# Patient Record
Sex: MALE | Race: WHITE | NOT HISPANIC OR LATINO | Employment: FULL TIME | URBAN - METROPOLITAN AREA
[De-identification: names, ages, dates, MRNs, and addresses within clinical notes are randomized per-mention and may not be internally consistent; named-entity substitution may affect disease eponyms.]

---

## 2023-06-21 ENCOUNTER — HOSPITAL ENCOUNTER (OUTPATIENT)
Dept: ULTRASOUND IMAGING | Facility: HOSPITAL | Age: 28
Discharge: HOME | End: 2023-06-21
Attending: UROLOGY
Payer: COMMERCIAL

## 2023-06-21 DIAGNOSIS — N45.1 EPIDIDYMITIS: ICD-10-CM

## 2023-06-21 PROCEDURE — 76870 US EXAM SCROTUM: CPT

## 2025-04-24 ENCOUNTER — OFFICE VISIT (OUTPATIENT)
Dept: FAMILY MEDICINE CLINIC | Facility: CLINIC | Age: 30
End: 2025-04-24
Payer: COMMERCIAL

## 2025-04-24 VITALS
HEART RATE: 62 BPM | HEIGHT: 75 IN | DIASTOLIC BLOOD PRESSURE: 60 MMHG | OXYGEN SATURATION: 98 % | BODY MASS INDEX: 25.12 KG/M2 | WEIGHT: 202 LBS | SYSTOLIC BLOOD PRESSURE: 122 MMHG

## 2025-04-24 DIAGNOSIS — Z13.1 SCREENING FOR DIABETES MELLITUS: ICD-10-CM

## 2025-04-24 DIAGNOSIS — R53.83 OTHER FATIGUE: Primary | ICD-10-CM

## 2025-04-24 DIAGNOSIS — Z13.220 SCREENING FOR LIPID DISORDERS: ICD-10-CM

## 2025-04-24 PROCEDURE — 99204 OFFICE O/P NEW MOD 45 MIN: CPT | Performed by: NURSE PRACTITIONER

## 2025-04-24 NOTE — PROGRESS NOTES
Name: Darío Dean      : 1995      MRN: [id number]  Encounter Provider: MARGARITA Ferrer  Encounter Date: 2025   Encounter department: [facility] PRIMARY CARE  :  Assessment & Plan  Other fatigue  Multiple labs were ordered to assess for causes of the patient's fatigue.  If all of the patient's lab work is normal he will be referred to a sleep medicine specialist to have testing completed to assess for LAKESHA or limb movement disorders.  Orders:  •  CBC and differential; Future  •  TSH, 3rd generation; Future  •  Iron Panel (Includes Ferritin, Iron Sat%, Iron, and TIBC); Future  •  Lyme Total AB W Reflex to IGM/IGG; Future  •  Vitamin D 25 hydroxy; Future    Screening for diabetes mellitus    Orders:  •  Comprehensive metabolic panel; Future  •  UA w Reflex to Microscopic w Reflex to Culture; Future  •  Hemoglobin A1C; Future    Screening for lipid disorders    Orders:  •  Lipid panel; Future          Depression Screening and Follow-up Plan: Patient was screened for depression during today's encounter. They screened negative with a PHQ-2 score of 0.        History of Present Illness   Fatigue: Patient reports over the past 4 months he has been experiencing increased fatigue.  He reports that he does typically get 6 to 8 hours of sleep per night however, his wife does report that he does seem to move a lot in his sleep.  He denies being told by his wife that he snores or has periods of apnea.      Review of Systems   Constitutional:  Positive for fatigue. Negative for chills and fever.   HENT:  Negative for ear pain and sore throat.    Eyes:  Negative for pain and visual disturbance.   Respiratory:  Negative for cough, chest tightness, shortness of breath and wheezing.    Cardiovascular:  Negative for chest pain, palpitations and leg swelling.   Gastrointestinal:  Negative for abdominal pain, constipation, diarrhea, nausea and vomiting.   Endocrine: Negative for cold intolerance and heat  "intolerance.   Genitourinary:  Negative for decreased urine volume, dysuria and hematuria.   Musculoskeletal:  Negative for arthralgias, back pain and myalgias.   Skin:  Negative for color change and rash.   Allergic/Immunologic: Negative for environmental allergies.   Neurological:  Negative for dizziness, seizures, syncope, weakness, light-headedness, numbness and headaches.   Hematological:  Negative for adenopathy.   Psychiatric/Behavioral:  Negative for confusion. The patient is not nervous/anxious.    All other systems reviewed and are negative.      Objective   /60 (BP Location: Right arm, Patient Position: Sitting, Cuff Size: Standard)   Pulse 62   Ht 6' 3\" (1.905 m)   Wt 91.6 kg (202 lb)   SpO2 98%   BMI 25.25 kg/m²      Physical Exam  Vitals and nursing note reviewed.   Constitutional:       General: He is not in acute distress.     Appearance: Normal appearance. He is well-developed. He is not ill-appearing.   HENT:      Head: Normocephalic.   Eyes:      Conjunctiva/sclera: Conjunctivae normal.   Cardiovascular:      Rate and Rhythm: Normal rate and regular rhythm.      Pulses: Normal pulses.           Carotid pulses are 2+ on the right side and 2+ on the left side.       Radial pulses are 2+ on the right side and 2+ on the left side.        Posterior tibial pulses are 2+ on the right side and 2+ on the left side.      Heart sounds: Normal heart sounds. No murmur heard.  Pulmonary:      Effort: Pulmonary effort is normal. No respiratory distress.      Breath sounds: Normal breath sounds. No decreased breath sounds, wheezing, rhonchi or rales.   Abdominal:      General: Abdomen is flat. Bowel sounds are normal. There is no distension.      Palpations: Abdomen is soft.      Tenderness: There is no abdominal tenderness. There is no guarding.   Musculoskeletal:         General: No swelling. Normal range of motion.      Cervical back: Normal range of motion and neck supple.      Right lower leg: No " edema.      Left lower leg: No edema.   Skin:     General: Skin is warm and dry.      Capillary Refill: Capillary refill takes less than 2 seconds.   Neurological:      General: No focal deficit present.      Mental Status: He is alert and oriented to person, place, and time.   Psychiatric:         Mood and Affect: Mood normal.         Behavior: Behavior normal.         Thought Content: Thought content normal.         Judgment: Judgment normal.

## 2025-04-24 NOTE — ASSESSMENT & PLAN NOTE
Multiple labs were ordered to assess for causes of the patient's fatigue.  If all of the patient's lab work is normal he will be referred to a sleep medicine specialist to have testing completed to assess for LAKESHA or limb movement disorders.  Orders:  •  CBC and differential; Future  •  TSH, 3rd generation; Future  •  Iron Panel (Includes Ferritin, Iron Sat%, Iron, and TIBC); Future  •  Lyme Total AB W Reflex to IGM/IGG; Future  •  Vitamin D 25 hydroxy; Future

## 2025-04-29 ENCOUNTER — RESULTS FOLLOW-UP (OUTPATIENT)
Dept: FAMILY MEDICINE CLINIC | Facility: CLINIC | Age: 30
End: 2025-04-29

## 2025-04-29 ENCOUNTER — APPOINTMENT (OUTPATIENT)
Dept: LAB | Facility: CLINIC | Age: 30
End: 2025-04-29
Payer: COMMERCIAL

## 2025-04-29 DIAGNOSIS — R53.83 OTHER FATIGUE: ICD-10-CM

## 2025-04-29 DIAGNOSIS — Z13.1 SCREENING FOR DIABETES MELLITUS: ICD-10-CM

## 2025-04-29 DIAGNOSIS — Z13.220 SCREENING FOR LIPID DISORDERS: ICD-10-CM

## 2025-04-29 LAB
25(OH)D3 SERPL-MCNC: 28.9 NG/ML (ref 30–100)
ALBUMIN SERPL BCG-MCNC: 4.3 G/DL (ref 3.5–5)
ALP SERPL-CCNC: 50 U/L (ref 34–104)
ALT SERPL W P-5'-P-CCNC: 40 U/L (ref 7–52)
ANION GAP SERPL CALCULATED.3IONS-SCNC: 10 MMOL/L (ref 4–13)
AST SERPL W P-5'-P-CCNC: 55 U/L (ref 13–39)
B BURGDOR IGG+IGM SER QL IA: NEGATIVE
BASOPHILS # BLD AUTO: 0.05 THOUSANDS/ÂΜL (ref 0–0.1)
BASOPHILS NFR BLD AUTO: 1 % (ref 0–1)
BILIRUB SERPL-MCNC: 1.05 MG/DL (ref 0.2–1)
BILIRUB UR QL STRIP: NEGATIVE
BUN SERPL-MCNC: 12 MG/DL (ref 5–25)
CALCIUM SERPL-MCNC: 9 MG/DL (ref 8.4–10.2)
CHLORIDE SERPL-SCNC: 104 MMOL/L (ref 96–108)
CHOLEST SERPL-MCNC: 147 MG/DL (ref ?–200)
CLARITY UR: CLEAR
CO2 SERPL-SCNC: 27 MMOL/L (ref 21–32)
COLOR UR: COLORLESS
CREAT SERPL-MCNC: 0.91 MG/DL (ref 0.6–1.3)
EOSINOPHIL # BLD AUTO: 0.17 THOUSAND/ÂΜL (ref 0–0.61)
EOSINOPHIL NFR BLD AUTO: 4 % (ref 0–6)
ERYTHROCYTE [DISTWIDTH] IN BLOOD BY AUTOMATED COUNT: 13 % (ref 11.6–15.1)
EST. AVERAGE GLUCOSE BLD GHB EST-MCNC: 94 MG/DL
FERRITIN SERPL-MCNC: 37 NG/ML (ref 30–336)
GFR SERPL CREATININE-BSD FRML MDRD: 112 ML/MIN/1.73SQ M
GLUCOSE P FAST SERPL-MCNC: 90 MG/DL (ref 65–99)
GLUCOSE UR STRIP-MCNC: NEGATIVE MG/DL
HBA1C MFR BLD: 4.9 %
HCT VFR BLD AUTO: 44.6 % (ref 36.5–49.3)
HDLC SERPL-MCNC: 75 MG/DL
HGB BLD-MCNC: 15.5 G/DL (ref 12–17)
HGB UR QL STRIP.AUTO: NEGATIVE
IMM GRANULOCYTES # BLD AUTO: 0.01 THOUSAND/UL (ref 0–0.2)
IMM GRANULOCYTES NFR BLD AUTO: 0 % (ref 0–2)
IRON SATN MFR SERPL: 57 % (ref 15–50)
IRON SERPL-MCNC: 148 UG/DL (ref 50–212)
KETONES UR STRIP-MCNC: NEGATIVE MG/DL
LDLC SERPL CALC-MCNC: 64 MG/DL (ref 0–100)
LEUKOCYTE ESTERASE UR QL STRIP: NEGATIVE
LYMPHOCYTES # BLD AUTO: 1.65 THOUSANDS/ÂΜL (ref 0.6–4.47)
LYMPHOCYTES NFR BLD AUTO: 40 % (ref 14–44)
MCH RBC QN AUTO: 32 PG (ref 26.8–34.3)
MCHC RBC AUTO-ENTMCNC: 34.8 G/DL (ref 31.4–37.4)
MCV RBC AUTO: 92 FL (ref 82–98)
MONOCYTES # BLD AUTO: 0.47 THOUSAND/ÂΜL (ref 0.17–1.22)
MONOCYTES NFR BLD AUTO: 12 % (ref 4–12)
NEUTROPHILS # BLD AUTO: 1.74 THOUSANDS/ÂΜL (ref 1.85–7.62)
NEUTS SEG NFR BLD AUTO: 43 % (ref 43–75)
NITRITE UR QL STRIP: NEGATIVE
NONHDLC SERPL-MCNC: 72 MG/DL
NRBC BLD AUTO-RTO: 0 /100 WBCS
PH UR STRIP.AUTO: 7 [PH]
PLATELET # BLD AUTO: 229 THOUSANDS/UL (ref 149–390)
PMV BLD AUTO: 11 FL (ref 8.9–12.7)
POTASSIUM SERPL-SCNC: 4 MMOL/L (ref 3.5–5.3)
PROT SERPL-MCNC: 6.7 G/DL (ref 6.4–8.4)
PROT UR STRIP-MCNC: NEGATIVE MG/DL
RBC # BLD AUTO: 4.85 MILLION/UL (ref 3.88–5.62)
SODIUM SERPL-SCNC: 141 MMOL/L (ref 135–147)
SP GR UR STRIP.AUTO: 1 (ref 1–1.03)
TIBC SERPL-MCNC: 259 UG/DL (ref 250–450)
TRANSFERRIN SERPL-MCNC: 185 MG/DL (ref 203–362)
TRIGL SERPL-MCNC: 41 MG/DL (ref ?–150)
TSH SERPL DL<=0.05 MIU/L-ACNC: 1.67 UIU/ML (ref 0.45–4.5)
UIBC SERPL-MCNC: 111 UG/DL (ref 155–355)
UROBILINOGEN UR STRIP-ACNC: <2 MG/DL
WBC # BLD AUTO: 4.09 THOUSAND/UL (ref 4.31–10.16)

## 2025-04-29 PROCEDURE — 83550 IRON BINDING TEST: CPT

## 2025-04-29 PROCEDURE — 80053 COMPREHEN METABOLIC PANEL: CPT

## 2025-04-29 PROCEDURE — 80061 LIPID PANEL: CPT

## 2025-04-29 PROCEDURE — 83540 ASSAY OF IRON: CPT

## 2025-04-29 PROCEDURE — 83036 HEMOGLOBIN GLYCOSYLATED A1C: CPT

## 2025-04-29 PROCEDURE — 81003 URINALYSIS AUTO W/O SCOPE: CPT

## 2025-04-29 PROCEDURE — 36415 COLL VENOUS BLD VENIPUNCTURE: CPT

## 2025-04-29 PROCEDURE — 82728 ASSAY OF FERRITIN: CPT

## 2025-04-29 PROCEDURE — 84443 ASSAY THYROID STIM HORMONE: CPT

## 2025-04-29 PROCEDURE — 82306 VITAMIN D 25 HYDROXY: CPT

## 2025-04-29 PROCEDURE — 86618 LYME DISEASE ANTIBODY: CPT

## 2025-04-29 PROCEDURE — 85025 COMPLETE CBC W/AUTO DIFF WBC: CPT

## 2025-04-30 DIAGNOSIS — R53.83 OTHER FATIGUE: ICD-10-CM

## 2025-04-30 DIAGNOSIS — R29.818 SUSPECTED SLEEP APNEA: Primary | ICD-10-CM

## 2025-05-08 ENCOUNTER — OFFICE VISIT (OUTPATIENT)
Dept: SLEEP CENTER | Facility: CLINIC | Age: 30
End: 2025-05-08
Attending: NURSE PRACTITIONER
Payer: COMMERCIAL

## 2025-05-08 VITALS
SYSTOLIC BLOOD PRESSURE: 122 MMHG | HEART RATE: 53 BPM | WEIGHT: 202 LBS | DIASTOLIC BLOOD PRESSURE: 62 MMHG | HEIGHT: 75 IN | BODY MASS INDEX: 25.12 KG/M2 | OXYGEN SATURATION: 99 %

## 2025-05-08 DIAGNOSIS — R53.83 OTHER FATIGUE: Primary | ICD-10-CM

## 2025-05-08 DIAGNOSIS — R29.818 SUSPECTED SLEEP APNEA: Primary | ICD-10-CM

## 2025-05-08 DIAGNOSIS — R53.83 OTHER FATIGUE: ICD-10-CM

## 2025-05-08 DIAGNOSIS — G47.8 NON-RESTORATIVE SLEEP: ICD-10-CM

## 2025-05-08 DIAGNOSIS — G47.19 EXCESSIVE DAYTIME SLEEPINESS: ICD-10-CM

## 2025-05-08 DIAGNOSIS — R79.0 LOW FERRITIN LEVEL: ICD-10-CM

## 2025-05-08 DIAGNOSIS — G25.81 RLS (RESTLESS LEGS SYNDROME): ICD-10-CM

## 2025-05-08 PROCEDURE — 99204 OFFICE O/P NEW MOD 45 MIN: CPT | Performed by: INTERNAL MEDICINE

## 2025-05-08 NOTE — PATIENT INSTRUCTIONS
Iron supplement: ferrous sulfate 325 mg together with vitamin C 100 mg to be taken on an empty stomach     Nursing Support:  When: Monday through Friday 7:30A-4:30PM except holidays  Where: Our direct line is [phone]  *3  *1.      If you are having a true emergency please call 911.  In the event that the line is busy or it is after hours please leave a voice message and we will return your call.  Please speak clearly, leaving your full name, birth date, best number to reach you and the reason for your call.   Medication refills: We will need the name of the medication, the dosage, the ordering provider, whether you get a 30 or 90 day refill, and the pharmacy name and address.  Medications will be ordered by the provider only.  Nurses cannot call in prescriptions.  Please allow 7 days for medication refills.  Physician requested updates: If your provider requested that you call with an update after starting medication, please be ready to provide us the medication and dosage, what time you take your medication, the time you attempt to fall asleep, time you fall asleep, when you wake up, and what time you get out of bed.  Sleep Study Results: We will contact you with sleep study results and/or next steps after the physician has reviewed your testing.

## 2025-05-08 NOTE — PROGRESS NOTES
Name: Darío Dean      : 1995      MRN: [id number]  Encounter Provider: River Murcia MD  Encounter Date: 2025   Encounter department: [facility] SLEEP MEDICINE [place]  :  Assessment & Plan  Suspected sleep apnea    Orders:    Ambulatory Referral to Sleep Medicine    Home Sleep Study; Future    Non-restorative sleep    Orders:    Home Sleep Study; Future    RLS (restless legs syndrome)         Excessive daytime sleepiness    Orders:    Home Sleep Study; Future    Other fatigue    Orders:    Ambulatory Referral to Sleep Medicine    Low ferritin level              PLAN:   Problems & Comorbidities Addressed this Visit as listed.  Stable/controlled conditions reviewed in notes.  With respect to above conditions, comprehensive counseling provided on pathophysiology; effects on symptoms and comorbidities, diagnostic strategies & limitations; treatment options; risks or no treament; risks & benefits of the various therapeutic options; costs and insurance aspects.     I advised weight reduction, avoiding sleeping supine, using alcohol or sedating medications close to bed time and on safe driving practices.    Further evaluation is indicated and a sleep study will be scheduled.  Patient understands limitations of home sleep testing and in lab study may be necessary.  Patient is willing to try Positive airway pressure therapy and will be scheduled for a titration study if indicated.  I advised taking iron supplement: ferrous sulfate 325 mg together with vitamin C 100 mg to be taken on an empty stomach   Follow-up to be scheduled after testing/initiation of therapy to review results, further details of treatment options and to adjust therapy.    History of Present Illness   HPI           Consultation - Sleep Center   Darío Dean  29 y.o. male  :1995  MRN:[id number]  DOS:2025    Physician Requesting Consult: Tacho Cook CR*             Reason for Consult : At your kind request I saw Darío  Dianne for initial sleep evaluation today. The patient is here to evaluate for suspected Obstructive Sleep Apnea.      PFSH, Problem List, Medications & Allergies were reviewed in EMR.    Darío  has a past medical history of Lyme disease (2008).      He currently has no medications in their medication list.      HPI: Patient's presents with: Constant fatigue irrespective of sleep of several years duration.  A medical workup as to cause has been unrevealing.  Darío is un aware of snoring or breathing difficulties during sleep but moves excessively during sleep.  Other complaints: None. Restless Leg Syndrome: Has typical symptoms involving left lower extremity that he is worse when trying to sleep.  He has;.  Parasomnia: Dentist reports teeth grinding during sleep for which he uses a dental guard;    Sleep Routine (averaged): Typical Bedtime: 11 PM.  Gets OOB: 7 AM. TIB:8 hrs.   Sleep latency:< 15 minutes; Sleep Interruptions: Infrequent,. Awakens: Needing alarm around 50%  Upon awakening: never feels rested.  He estimates getting  hrs sleep.  Daytime Function:Darío reports excessive daytime sleepiness feels like napping & does when has the opportunity 3-4 times a week for around 20 minutes or uses caffeine to assist staying alert. He rated himself at Total score: 10 /24 on the [place] Sleepiness Scale.     Habits:   reports that he has never smoked. He has never used smokeless tobacco.;  reports no history of alcohol use.; Reports no history of drug use.;  E-Cigarette/Vaping  Never User; Caffeine use:limited until  3 PM; Exercise routine: regular.    Occupation:   [employer] License:    Family History: Negative for sleep disturbance.  ROS: Significant for weight has been stable.  He has some nasal symptoms due to seasonal allergies.  He reported no other respiratory or cardiac symptoms.  There is no report of radicular symptoms or abnormal blood loss..     EXAM:  /62 (BP Location: Left arm, Cuff Size: Adult)   Pulse  "(!) 53   Ht 6' 3\" (1.905 m)   Wt 91.6 kg (202 lb)   SpO2 99%   BMI 25.25 kg/m²    General: Well groomed male, well appearing, in no apparent distress.   Neurological: Alert and orientated; cooperative; Cranial nerves intact;    Psychiatric: Speech: Clear and coherent; normal mood, affect & thought   Skin: Warm and dry; Color& Hydration good; no facial rashes or lesions   HEENT:  Craniofacial anatomy: normal Sinuses: Non-tender. TMJ: Normal    Eyes: EOM's intact; conjunctiva/corneas clear   Ears: Appear normal     Nasal Airway: is patent Septum: Intact; Turbinates: Normal; Rhinorrhea: None  Mouth: Lips: Normal posture; Dentition: normal . Mucosa: Moist; Hard Palate:normal    Oropharryx: crowdedTongue: Mallampati:Class IV and MobileSoft Palate:  redundant  Tonsils: absent  Neck:;\"; [no abnormal masses; Supple; no abnormal masses; Thyroid: Normal. Trachea: Central.    Heart: S1,S2 normal; RRR; no gallop; no murmur   Lungs: Respiratory Effort: Normal. Air entry good bilaterally.  No wheezes.  No rales  Abdomen:  Soft.   Extremities: No pedal edema.  No clubbing or cyanosis.    Musculoskeletal:  Motor normal; Gait: Normal.       CBC and CMP were normal.  Ferritin level is low at 37 ng/mL    Sincerely,      Authenticated electronically on 05/08/25   Board Certified Specialist     Portions of the record may have been created with voice recognition software. Occasional wrong word or \"sound a like\" substitutions may have occurred due to the inherent limitations of voice recognition software. There may also be notations and random deletions of words or characters from malfunctioning software. Read the chart carefully and recognize, using context, where substitutions/deletions have occurred.          Review of Systems           "

## 2025-05-22 ENCOUNTER — HOSPITAL ENCOUNTER (OUTPATIENT)
Facility: HOSPITAL | Age: 30
Discharge: HOME | End: 2025-05-22
Attending: INTERNAL MEDICINE

## 2025-05-22 DIAGNOSIS — G47.19 EXCESSIVE DAYTIME SLEEPINESS: ICD-10-CM

## 2025-05-22 DIAGNOSIS — G47.8 NON-RESTORATIVE SLEEP: ICD-10-CM

## 2025-05-22 DIAGNOSIS — R29.818 SUSPECTED SLEEP APNEA: ICD-10-CM

## 2025-05-22 NOTE — PROGRESS NOTES
Home Sleep Study Documentation    HOME STUDY DEVICE: Noxturnal no                                           Alejandra G3 yes device # 18      Pre-Sleep Home Study:    Set-up and instructions performed by: Zaria    Technician performed demonstration for Patient: yes    Return demonstration performed by Patient: yes    Written instructions provided to Patient: yes    Patient signed consent form: yes        Post-Sleep Home Study:    Additional comments by Patient: pending    Home Sleep Study Failed:pending    Failure reason: pending    Reported or Detected: pending    Scored by: pending

## 2025-05-27 PROBLEM — G47.33 OSA (OBSTRUCTIVE SLEEP APNEA): Status: ACTIVE | Noted: 2025-05-27

## 2025-06-06 ENCOUNTER — TELEPHONE (OUTPATIENT)
Dept: SLEEP CENTER | Facility: CLINIC | Age: 30
End: 2025-06-06

## 2025-06-06 NOTE — TELEPHONE ENCOUNTER
Home sleep study resulted and shows mild sleep apnea. GIANLUCA 5.3. Follow up with Dr. Murcia recommended.     Incoming call from patient who states he can't get into [facility] to read results.     Results and recommendation reviewed with patient.     Follow up scheduled with Dr. Murcia for 6/11/2025 in the [place] office.  Per patient request to get in ASAP as he is leaving the country for quite some time at the end of the month.     Patient given number to get assistance with [employer].     Email to Kecia Sanches and Jerica Laura to inform of add on patient.

## 2025-06-09 ENCOUNTER — TELEPHONE (OUTPATIENT)
Age: 30
End: 2025-06-09

## 2025-06-09 NOTE — TELEPHONE ENCOUNTER
Patient called in had a home sleep study on 5/22/25 and was seen for a consult in [place] on 5/08/25  Patient received a bill of $1,000 for sleep study  Patient spoke with Deborah in billing who transferred him to sleep center and advised patient to speak with    Patient requesting call back to discuss  # [phone]

## 2025-06-09 NOTE — TELEPHONE ENCOUNTER
Patient called, he received a message a few weeks ago regarding a test that was ordered to check testosterone levels.  Pt is not sure how he moves forward with this testing.    Confirmed there is an active lab order in my chart. Testosterone, free, total   No paperwork is needed if patient goes to [facility]'s lab.  This is a patient instruction: Fasting preferred. Collections for men not undergoing treatment must be completed between 7am-9am ONLY. Collection time restrictions are not applicable to women or men already undergoing treatment.      Patient asked if this lab test is covered by his insurance.  Advised patient needs to call his insurance for this information. Provided billing and diagnosis codes per order, pt will provide to insurance for estimated cost.   CPT codes, 57004 and 55838  Diagnosis:  Other fatigue (R53.83 [ICD-10-CM])    No call back needed.

## 2025-06-10 NOTE — TELEPHONE ENCOUNTER
Patient called the billing department and they sent him back to our office. Patient is stating that billing said that we our office has to fix the billing problem. I let patient know that I am just the  and I do not do anything with billing. Patient is very upset and does not understand why he got this bill. Patient has stated that he might not want to come back to the office anymore. Patient would like for a manger to call him.

## 2025-06-11 ENCOUNTER — OFFICE VISIT (OUTPATIENT)
Dept: SLEEP CENTER | Facility: CLINIC | Age: 30
End: 2025-06-11
Payer: COMMERCIAL

## 2025-06-11 VITALS
HEIGHT: 75 IN | WEIGHT: 199.6 LBS | BODY MASS INDEX: 24.82 KG/M2 | SYSTOLIC BLOOD PRESSURE: 124 MMHG | DIASTOLIC BLOOD PRESSURE: 74 MMHG

## 2025-06-11 DIAGNOSIS — G47.33 MILD OBSTRUCTIVE SLEEP APNEA: Primary | ICD-10-CM

## 2025-06-11 DIAGNOSIS — R53.83 OTHER FATIGUE: ICD-10-CM

## 2025-06-11 DIAGNOSIS — G47.8 NON-RESTORATIVE SLEEP: ICD-10-CM

## 2025-06-11 DIAGNOSIS — G25.81 RLS (RESTLESS LEGS SYNDROME): ICD-10-CM

## 2025-06-11 DIAGNOSIS — G47.19 EXCESSIVE DAYTIME SLEEPINESS: ICD-10-CM

## 2025-06-11 DIAGNOSIS — R79.0 LOW FERRITIN LEVEL: ICD-10-CM

## 2025-06-11 PROCEDURE — 99214 OFFICE O/P EST MOD 30 MIN: CPT | Performed by: INTERNAL MEDICINE

## 2025-06-11 NOTE — PROGRESS NOTES
Name: Darío Dean      : 1995      MRN: [id number]  Encounter Provider: River Murcia MD  Encounter Date: 2025   Encounter department: [facility] SLEEP MEDICINE [place]  :  Assessment & Plan  Mild obstructive sleep apnea         Non-restorative sleep         RLS (restless legs syndrome)         Excessive daytime sleepiness         Other fatigue         Low ferritin level         PLAN  -    Above listed Problems & Comorbidities were Addressed this Visit.  Conditions are improved/stable/controlled/resolved as reviewed in notes and additional Plans below.   I reviewed results of his study with him, discussed treatment options with risks and benefits.  He is due to relocate to [place] in a few weeks that limits his treatment options.  I advised continuing iron supplement targeting ferritin level of close to 100 ng/mL  He should take safety precaution with respect to possible parasomnia activity.  If RLS symptoms and excessive movement during sleep persists a therapeutic trial of gabapentin can be considered.  He may consider positive airway pressure therapy or a mandibular advancement device for his mild sleep disordered breathing.   Since finding of home sleep testing is equivocal and the relatively mild sleep disordered breathing does not adequately explain his symptoms, an in-lab  diagnostic study can be considered to confirm obstructive sleep apnea, possible upper airway resistance, whether they are also periodic limb movements of sleep or parasomnia activity.  He may also warrant an MSLT to objectively quantify presence and severity of excessive daytime drowsiness.  This will need to be undertaken when he is back in [place].  I have not scheduled any follow-up in sleep clinic at this time.    Thank you for allowing me to participate in the care of this patient.    History of Present Illness   HPI              Follow-Up Note - Sleep Center   Darío Jadenicole  29 y.o. male  :1995   "MRN:[id number]  DOS:6/11/2025    CC: I saw this patient for follow-up in clinic today for mild obstructive sleep apnea, Coexisting Sleep and Medical Problems. Interval changes: Home sleep testing was undertaken to evaluate for sleep disordered breathing and patient is here to review results and further options.     The study demonstrated a respiratory event index (GIANLUCA) of 5.3.  The lowest SpO2 recorded is 90%.  The snore index was 0.1%.     Additional comments by patient: \"Played soccer the evening before starting the study\" and as a result, had difficulty falling asleep.    PFSH, Problem List, Medications & Allergies were reviewed in EMR.   He  has a past medical history of Lyme disease (2008).    He currently has no medications in their medication list.    HPI: He is not aware of snoring.  Continues to report dysesthesias involving left thigh that tends to get worse at bedtime.  He has urges to move and can delay sleep.  He feels his symptoms have improved a little since he started taking iron supplement.  He moves excessively during sleep and wife reports flailing of his arms and on several occasions has lashed out at her heart fortunately has not injured his wife.  He has no recall of dreams associated with his acting out.  There is no report of sleepwalking.  Sleep Routine: Darío reports getting 8.5 hrs sleep; he has no difficulty initiating or maintaining sleep . He arises needing alarm around 50% and never feels rested.Darío reports excessive daytime sleepiness, feels like napping & does when has the opportunity.  He rated himself at Total score: 7 /24 on the [place] Sleepiness Scale.   Habits: Reports that he has never smoked. He has never used smokeless tobacco.,  Reports no history of alcohol use.,  Reports no history of drug use., Caffeine use: moderate amount to assist staying alert, Exercise routine: regular.     ROS: reviewed significant for weight has been stable.  He has seasonal allergies but " "presently reporting no nasal symptoms..        EXAM: /74   Ht 6' 3\" (1.905 m)   Wt 90.5 kg (199 lb 9.6 oz)   BMI 24.95 kg/m²     Wt Readings from Last 3 Encounters:   06/11/25 90.5 kg (199 lb 9.6 oz)   05/08/25 91.6 kg (202 lb)   04/24/25 91.6 kg (202 lb)     Patient is well groomed; well appearing.  CNS: Alert, orientated, clear & coherent speech.  Psych: cooperative and in no distress. Mental state: Appears normal.  H&N: EOMI; NC/AT: No facial pressure marks, no rashes.    Skin/Extrem: col & hydration normal; no edema  Resp: Respiratory effort is normal  Physical findings otherwise essentially unchanged from previous.      Sincerely,     Authenticated electronically on 06/11/25   Board Certified Specialist     Portions of the record may have been created with voice recognition software. Occasional wrong word or \"sound a like\" substitutions may have occurred due to the inherent limitations of voice recognition software. There may also be notations and random deletions of words or characters from malfunctioning software. Read the chart carefully and recognize, using context, where substitutions/deletions have occurred.    Sitting and reading: Slight chance of dozing  Watching TV: Moderate chance of dozing  Sitting, inactive in a public place (e.g. a theatre or a meeting): Would never doze  As a passenger in a car for an hour without a break: Slight chance of dozing  Lying down to rest in the afternoon when circumstances permit: High chance of dozing  Sitting and talking to someone: Would never doze  Sitting quietly after a lunch without alcohol: Would never doze  In a car, while stopped for a few minutes in traffic: Would never doze  Total score: 7     Review of Systems  Pertinent positives/negatives included in HPI and also as noted:       Objective   /74   Ht 6' 3\" (1.905 m)   Wt 90.5 kg (199 lb 9.6 oz)   BMI 24.95 kg/m²        Physical Exam  Visit Vitals  /74   Ht 6' 3\" (1.905 m)   Wt 90.5 " kg (199 lb 9.6 oz)   BMI 24.95 kg/m²   Smoking Status Never   BSA 2.19 m²                                                 Data  Lab Results   Component Value Date    HGB 15.5 04/29/2025    HCT 44.6 04/29/2025    MCV 92 04/29/2025      Lab Results   Component Value Date    CALCIUM 9.0 04/29/2025    K 4.0 04/29/2025    CO2 27 04/29/2025     04/29/2025    BUN 12 04/29/2025    CREATININE 0.91 04/29/2025     Lab Results   Component Value Date    IRON 148 04/29/2025    TIBC 259 04/29/2025    FERRITIN 37 04/29/2025     Lab Results   Component Value Date    AST 55 (H) 04/29/2025    ALT 40 04/29/2025

## 2025-06-11 NOTE — TELEPHONE ENCOUNTER
Asked for Billing Dept manager to contact me.  I do not understand why the patient keeps getting sent to the office regarding his $1000 bill.  I assume it is his deductible.

## 2025-06-13 ENCOUNTER — RESULTS FOLLOW-UP (OUTPATIENT)
Dept: FAMILY MEDICINE CLINIC | Facility: CLINIC | Age: 30
End: 2025-06-13

## 2025-06-13 LAB
TESTOST FREE SERPL-MCNC: 83.5 PG/ML (ref 35–155)
TESTOST SERPL-MCNC: 843 NG/DL (ref 250–1100)

## 2025-06-18 ENCOUNTER — OFFICE VISIT (OUTPATIENT)
Dept: FAMILY MEDICINE CLINIC | Facility: CLINIC | Age: 30
End: 2025-06-18
Payer: COMMERCIAL

## 2025-06-18 VITALS
DIASTOLIC BLOOD PRESSURE: 66 MMHG | HEIGHT: 75 IN | TEMPERATURE: 96.9 F | HEART RATE: 58 BPM | OXYGEN SATURATION: 99 % | BODY MASS INDEX: 24.99 KG/M2 | SYSTOLIC BLOOD PRESSURE: 110 MMHG | WEIGHT: 201 LBS | RESPIRATION RATE: 18 BRPM

## 2025-06-18 DIAGNOSIS — R79.0 LOW FERRITIN: ICD-10-CM

## 2025-06-18 DIAGNOSIS — R53.83 OTHER FATIGUE: Primary | ICD-10-CM

## 2025-06-18 DIAGNOSIS — E55.9 VITAMIN D DEFICIENCY: ICD-10-CM

## 2025-06-18 PROCEDURE — 99214 OFFICE O/P EST MOD 30 MIN: CPT | Performed by: NURSE PRACTITIONER

## 2025-06-18 NOTE — ASSESSMENT & PLAN NOTE
Repeat vitamin D level was ordered to be completed prior to the patient's next office visit.  Orders:  •  Vitamin D 25 hydroxy; Future

## 2025-06-18 NOTE — ASSESSMENT & PLAN NOTE
I did advise the patient that I feel that his ongoing fatigue is likely related to his vitamin D and iron deficiencies.  He was advised to begin taking daily over-the-counter vitamin D and iron supplements.

## 2025-06-18 NOTE — PROGRESS NOTES
Name: Darío Dean      : 1995      MRN: [id number]  Encounter Provider: MARGARITA Ferrer  Encounter Date: 2025   Encounter department: [facility]  :  Assessment & Plan  Other fatigue  I did advise the patient that I feel that his ongoing fatigue is likely related to his vitamin D and iron deficiencies.  He was advised to begin taking daily over-the-counter vitamin D and iron supplements.       Vitamin D deficiency  Repeat vitamin D level was ordered to be completed prior to the patient's next office visit.  Orders:  •  Vitamin D 25 hydroxy; Future    Low ferritin  Repeat iron panel was ordered to be completed prior to the patient's next office visit.  Orders:  •  Iron Panel (Includes Ferritin, Iron Sat%, Iron, and TIBC); Future          Depression Screening and Follow-up Plan: Patient was screened for depression during today's encounter. They screened negative with a PHQ-2 score of 0.        History of Present Illness   Vitamin D deficiency: Patient's most recent vitamin D level was slightly low.  Patient is not currently taking a vitamin D supplement.    Fatigue: The patient does report that he continues to experience increased fatigue.  Patient's most recent lab work did show a slightly low vitamin D level and a low normal ferritin level.  The rest of his lab work was normal.  The patient did recently have follow-up with sleep medicine and per their note he was noted to have mild sleep apnea but his symptoms were not severe enough to the point that they felt that CPAP or BiPAP therapy was necessary.      Review of Systems   Constitutional:  Positive for fatigue. Negative for chills and fever.   HENT:  Negative for ear pain and sore throat.    Eyes:  Negative for pain and visual disturbance.   Respiratory:  Negative for cough, chest tightness, shortness of breath and wheezing.    Cardiovascular:  Negative for chest pain, palpitations and leg swelling.  "  Gastrointestinal:  Negative for abdominal pain, constipation, diarrhea, nausea and vomiting.   Endocrine: Negative for cold intolerance and heat intolerance.   Genitourinary:  Negative for decreased urine volume, dysuria and hematuria.   Musculoskeletal:  Negative for arthralgias, back pain and myalgias.   Skin:  Negative for color change and rash.   Allergic/Immunologic: Negative for environmental allergies.   Neurological:  Negative for dizziness, seizures, syncope, weakness, light-headedness, numbness and headaches.   Hematological:  Negative for adenopathy.   Psychiatric/Behavioral:  Negative for confusion. The patient is not nervous/anxious.    All other systems reviewed and are negative.      Objective   /66 (BP Location: Right arm, Patient Position: Sitting, Cuff Size: Adult)   Pulse 58   Temp (!) 96.9 °F (36.1 °C) (Tympanic)   Resp 18   Ht 6' 3\" (1.905 m)   Wt 91.2 kg (201 lb)   SpO2 99%   BMI 25.12 kg/m²      Physical Exam  Vitals and nursing note reviewed.   Constitutional:       General: He is not in acute distress.     Appearance: Normal appearance. He is well-developed. He is not ill-appearing.   HENT:      Head: Normocephalic.     Eyes:      Conjunctiva/sclera: Conjunctivae normal.       Cardiovascular:      Rate and Rhythm: Normal rate and regular rhythm.      Pulses: Normal pulses.           Carotid pulses are 2+ on the right side and 2+ on the left side.       Radial pulses are 2+ on the right side and 2+ on the left side.        Posterior tibial pulses are 2+ on the right side and 2+ on the left side.      Heart sounds: Normal heart sounds. No murmur heard.  Pulmonary:      Effort: Pulmonary effort is normal. No respiratory distress.      Breath sounds: Normal breath sounds. No decreased breath sounds, wheezing, rhonchi or rales.   Abdominal:      General: Abdomen is flat. There is no distension.      Palpations: Abdomen is soft.      Tenderness: There is no abdominal tenderness. " There is no guarding.     Musculoskeletal:         General: No swelling. Normal range of motion.      Cervical back: Normal range of motion and neck supple.      Right lower leg: No edema.      Left lower leg: No edema.     Skin:     General: Skin is warm and dry.      Capillary Refill: Capillary refill takes less than 2 seconds.     Neurological:      General: No focal deficit present.      Mental Status: He is alert and oriented to person, place, and time.     Psychiatric:         Mood and Affect: Mood normal.         Behavior: Behavior normal.         Thought Content: Thought content normal.         Judgment: Judgment normal.